# Patient Record
Sex: FEMALE | URBAN - METROPOLITAN AREA
[De-identification: names, ages, dates, MRNs, and addresses within clinical notes are randomized per-mention and may not be internally consistent; named-entity substitution may affect disease eponyms.]

---

## 2020-01-04 ENCOUNTER — HOSPITAL ENCOUNTER (EMERGENCY)
Age: 40
Discharge: HOME | End: 2020-01-04
Attending: EMERGENCY MEDICINE
Payer: COMMERCIAL

## 2020-01-04 VITALS
OXYGEN SATURATION: 98 % | HEART RATE: 80 BPM | WEIGHT: 170 LBS | BODY MASS INDEX: 29.02 KG/M2 | HEIGHT: 64 IN | TEMPERATURE: 98 F | SYSTOLIC BLOOD PRESSURE: 128 MMHG | RESPIRATION RATE: 16 BRPM | DIASTOLIC BLOOD PRESSURE: 71 MMHG

## 2020-01-04 DIAGNOSIS — J02.9 VIRAL PHARYNGITIS: Primary | ICD-10-CM

## 2020-01-04 PROCEDURE — 99283 EMERGENCY DEPT VISIT LOW MDM: CPT

## 2020-01-04 PROCEDURE — 87081 CULTURE SCREEN ONLY: CPT | Performed by: EMERGENCY MEDICINE

## 2020-01-04 PROCEDURE — 87430 STREP A AG IA: CPT | Performed by: EMERGENCY MEDICINE

## 2020-01-04 RX ORDER — PREDNISONE 20 MG/1
40 TABLET ORAL DAILY
Qty: 10 TABLET | Refills: 0 | Status: SHIPPED | OUTPATIENT
Start: 2020-01-04 | End: 2020-01-09

## 2020-01-04 NOTE — ED PROVIDER NOTES
Patient Seen in: [facility] Emergency Department In [place]      History   Patient presents with:  Sore Throat    Stated Complaint: sore throat; denies fever     HPI    The patient is a 23-year-old female presents emergency room with a history of sore thr There is no focal tenderness to palpation appreciated. There is no JVD. No meningeal signs or nuchal rigidity appreciated. No stridor. There is some anterior chain lymphadenopathy appreciated. There is no asymmetry appreciated.   LUNGS: Clear to auscultat

## 2020-01-05 ENCOUNTER — TELEPHONE (OUTPATIENT)
Dept: INTERNAL MEDICINE CLINIC | Facility: CLINIC | Age: 40
End: 2020-01-05

## 2020-01-06 NOTE — TELEPHONE ENCOUNTER
Date of encounter: 1/4/2020   Location:  ED   Diagnosis: Viral pharyngitis  Treatment: Rx'd prednisone  Follow-up recommendation: See PCP    Doesn't seem like pt has PCP. Please reach out to patient to establish care.    If agreeable, schedule ER follow-

## (undated) NOTE — ED AVS SNAPSHOT
Rajiv Reyes   MRN: [id number]    Department:  [facility] Emergency Department in [place]   Date of Visit:  1/4/2020           Disclosure     Insurance plans vary and the physician(s) referred by the ER may not be covered by your plan.  Please co tell this physician (or your personal doctor if your instructions are to return to your personal doctor) about any new or lasting problems. The primary care or specialist physician will see patients referred from the [facility] Emergency Department.  Shanique Ward